# Patient Record
Sex: FEMALE | Race: WHITE | NOT HISPANIC OR LATINO | Employment: FULL TIME | URBAN - METROPOLITAN AREA
[De-identification: names, ages, dates, MRNs, and addresses within clinical notes are randomized per-mention and may not be internally consistent; named-entity substitution may affect disease eponyms.]

---

## 2017-02-14 ENCOUNTER — ALLSCRIPTS OFFICE VISIT (OUTPATIENT)
Dept: OTHER | Facility: OTHER | Age: 27
End: 2017-02-14

## 2018-01-10 NOTE — PROGRESS NOTES
AUG 25 2016         RE: Kenyatta Bruner                               To: [facility]   MR#: [id number]                                   [address]   : [address] [facility], [address] [place] [address]   ENC: [id number]                             Fax: [phone]   (Exam #: [id number])      The LMP of this 23year old,  G4, P2-0-1-2 patient was TARAN 10 2016, giving   her an MARTHA of OCT 12 2016 and a current gestational age of 26 weeks 4 days   by dates  A sonographic examination was performed on AUG 25 2016 using   real time equipment  The ultrasound examination was performed using   abdominal technique  The patient has a BMI of 28 0  Her blood pressure   today was 119/64  Earliest ultrasound found in her record: 16   8w2d   10/16/16 MARTHA         Problem list:   1  Bilateral choroid plexus cysts seen on her 20 week ultrasound have   resolved by 32 weeks   2  Increased inhibin 2 14 MOM      Cardiac motion was observed at 138 bpm       INDICATIONS      abnormal uterine Doppler   fetal growth      Exam Types      Level I      RESULTS      Fetus # 1 of 1   Vertex presentation   Fetal growth appeared normal   Placenta Location = Posterior, fundal   No placenta previa   Placenta Grade = II      MEASUREMENTS (* Included In Average GA)      AC              29 2 cm        33 weeks 2 days* (59%)   BPD              8 4 cm        33 weeks 6 days* (64%)   HC              30 8 cm        33 weeks 6 days* (56%)   Femur            6 1 cm        31 weeks 4 days* (33%)      HC/AC           1 05   FL/AC           0 21   FL/BPD          0 73   Ceph Index      0 80   EFW (Ac/Fl/Hc)  2078 grams - 4 lbs 9 oz                 (48%)      THE AVERAGE GESTATIONAL AGE is 33 weeks 1 day +/- 18 days        AMNIOTIC FLUID      Q1: 4 3      Q2: 5 7      Q3: 6 0      Q4: 4 1   VERO Total = 20 1 cm   Amniotic Fluid: Normal      ANATOMY DETAILS      Visualized Appearing Sonographically Normal: HEAD: (Calvarium, BPD Level, Cavum, Lateral Ventricles, Choroid Plexus,   Cerebellum, Cisterna Magna);    TH  CAV : (Diaphragm); HEART: (Four   Chamber View, Proximal Left Outflow, Proximal Right Outflow, 3 Vessel   Trachea, Interventricular Septum, Interatrial Septum, Cardiac Axis,   Cardiac Position);    STOMACH, RIGHT KIDNEY, LEFT KIDNEY, BLADDER, PLACENTA      ANATOMY COMMENTS      The prior bilateral choroid plexus cysts have resolved  IMPRESSION      Patricia IUP   33 weeks and 1 day by this ultrasound  (MARTHA=OCT 12 2016)   Vertex presentation   Fetal growth appeared normal   Regular fetal heart rate of 138 bpm   Posterior, fundal placenta   No placenta previa      GENERAL COMMENT      The patient was informed of the findings and counseled about the   limitations of the exam in detecting all forms of fetal congenital   abnormalities  The patient has no complaints today  She reports regular fetal movements   and denies problems related to hypertension, gestational diabetes,    labor, or vaginal bleeding  Her prenatal course has apparently been   unremarkable in the interim since her most recent visit here  Exam shows she is comfortable and her abdomen is non tender  Follow up recommended:   No further follow up US's are recommended unless other clinical indicators   arise  RECOMMENDATION      Growth Ultrasound: As indicated      ELIA Lagunas M D     Maternal-Fetal Medicine   Electronically signed 16 16:45

## 2018-01-11 NOTE — PROGRESS NOTES
2016         RE: Lynette Bravo                               To: [facility]   MR#: [id number]                                   [address] [facility]   : APR [address] , [facility]   Toshia Jain: [phone]   (Exam #: [id number])      The LMP of this 26year old,  4, para 2 patient was TARAN 10 2016,   giving her an MARTHA of OCT 12 2016 and a current gestational age of 16 weeks   5 days by dates  A sonographic examination was performed on 2016   using real time equipment  The ultrasound examination was performed using   abdominal technique  The patient has a BMI of 23 9  Her blood pressure   today was 125/71  Earliest ultrasound found in her record: 16   8w2d   10/16/16 MARTHA            Marvel Peres has no complaints  She denies vaginal bleeding  Her prenatal course   has been unremarkable in the interim since her initial MFM evaluation  Multiple longitudinal and transverse sections revealed a alvarenga   intrauterine pregnancy with the fetus in variable presentation  The   placenta is posterior in implantation, and there is no placenta previa  Cardiac motion was observed at 163 bpm       INDICATIONS      first trimester screening      RESULTS      Fetus # 1 of 1   Variable presentation   Fetal growth appeared normal      MEASUREMENTS (* Included In Average GA)      CRL              6 6 cm        12 weeks 5 days*   Nuchal Trans    1 60 mm      THE AVERAGE GESTATIONAL AGE is 12 weeks 5 days +/- 7 days  UTERINE ARTERIES                                  S/D   PI    RI    NOTCH       Left Uterine Artery              2 35         Yes       Right Uterine Artery             1 96         Yes      ANATOMY COMMENTS      Anatomic detail is limited at this gestational age  The yolk sac was not   noted    The fetal cranium appeared normal in shape and the nuchal   translucency was normal in size at 1 6 mm   The nasal bone appears to be   present  The intracranial anatomy was unremarkable  Anatomy of the   fetal thorax appeared within normal limits  The cardiac rhythm was   regular  The four-chamber, alcohol tract, and 3 vessel tracheal views   appear normal   Within the abdomen, stomach was visualized and the   abdominal wall appeared intact  A three vessel cord appears to be present  Active movement of the fetal body & extremities was seen  There is no   suspicion of a subchorionic bleed  The placental cord insertion was   normal    The uterine artery Dopplers are abnormal for this gestational   age  There is no suspicion of a uterine myoma  Free fluid is not seen in   the posterior cul-de-sac  ADNEXA      The left ovary appeared normal and measured 2 3 x 2 1 x 1 9 cm with a   volume of 4 8 cc  The right ovary appeared normal and measured 1 7 x 2 3 x   4 0 cm with a volume of 8 2 cc  AMNIOTIC FLUID      Largest Vertical Pocket = 3 0 cm   Amniotic Fluid: Normal      IMPRESSION      Patricia IUP   12 weeks and 5 days by this ultrasound  (MARTHA=OCT 16 2016)   Variable presentation   Fetal growth appeared normal   Regular fetal heart rate of 163 bpm   Posterior placenta   No placenta previa      GENERAL COMMENT      Today's ultrasound findings and suggested follow-up were discussed in   detail with Michelle  The Sequential Screen was discussed in detail,   including the sensitivities for detection of Down syndrome, Trisomy 18,   and open neural tube defects  Definitive prenatal diagnosis is possible   only through genetic amniocentesis or CVS   Michelle underwent fingerstick   blood collection for hCG and FLAVIA-A to complete the initial component of   the Sequential Screen  Results should be available within one week  Follow-up multiple marker serum screening at 16-18 weeks gestation is   recommended to complete the Sequential Screen    Fetal Level II ultrasound   imaging is scheduled at about 20 weeks gestation  The abnormal maternal uterine artery Doppler study is associated with an   increased risk for adverse pregnancy outcomes related to abnormal   placentation, including IUGR and preeclampsia  Daily low dose aspirin   therapy initiated at less than 16 weeks gestation is associated with a   significant reduction of these risks  I recommended that Michelle initiate   aspirin therapy this week  The face to face time, in addition to time spent discussing ultrasound   results, was 10 minutes, greater than 50% of which was spent during   counseling and coordination of care  ELIA Chase M D     Maternal-Fetal Medicine   Electronically signed 04/11/16 09:01

## 2018-01-11 NOTE — PROGRESS NOTES
MAR 15 2016         RE: Devendra Goes                               To: [facility]   MR#: [id number]                                   [address]   : [address] [facility], [address] [place] [address]   ENC: [id number]                             Fax: [phone]   (Exam #: [id number])      The LMP of this 26year old,  4, para 2 patient was TARAN 10 2016,   giving her an MARTHA of OCT 12 2016 and a current gestational age of 10 weeks   2 days by dates  A sonographic examination was performed on MAR 15 2016   using real time equipment  The ultrasound examination was performed using   abdominal technique  The patient has a BMI of 23 9  Her blood pressure   today was 118/61  Earliest ultrasound found in her record: 16   8w2d   10/16/16 MARTHA       Thank you very much for referring this very nice patient for a    evaluation and viability ultrasound  This is the patient's fourth   pregnancy  She has a history of 2 previous full-term vaginal deliveries   in  and  respectively  She had a first trimester miscarriage in   October of last year  An early ultrasound in the office was concerning   for a possible subchorionic hematoma although the patient has not had any   bleeding, cramps, or other symptoms thereof  She has no significant   medical or surgical history otherwise  She denies any current use of   tobacco, alcohol, or drugs  Her medications include prenatal vitamins and   fish oil and she has no known drug allergies  Her family medical history   is noncontributory  Review of systems is otherwise negative  On exam, the   patient appears well, in no acute distress, and her abdomen is nontender  Multiple longitudinal and transverse sections revealed a alvarenga   intrauterine pregnancy  A normal gestational sac was documented  A normal fetal pole was   visualized   Cardiac motion was observed at 176 bpm  The yolk sac was seen,   measuring 0 39 cm  The amnion was also documented  INDICATIONS      abnormal findings on outside ultrasound      Exam Types      Level I      MEASUREMENTS (* Included In Average GA)      CRL              2 3 cm        8 weeks 6 days *      THE AVERAGE GESTATIONAL AGE is 8 weeks 6 days +/- 5 days  ANATOMY COMMENTS      Anatomic detail is extremely limited at this gestational age  However, a   discrete fetal pole with cardiac and fetal body motion was documented  Limb buds were documented as well  The yolk sac was clearly seen, and the   gestational sac is normal in appearance and located in the fundus of the   uterus  Left lateral and inferior to the sac appears to be a collapsed   twin gestational sac  Free fluid is not seen in the posterior cul-de-sac  ADNEXA      The left ovary appeared normal and measured 2 4 x 2 0 x 1 5 cm with a   volume of 3 8 cc  The right ovary appeared normal and measured 3 4 x 2 1 x   1 9 cm with a volume of 7 1 cc  IMPRESSION      Patricia IUP   8 weeks and 6 days by this ultrasound  (MARTHA=OCT 19 2016)   Regular fetal heart rate of 176 bpm      GENERAL COMMENT      Today's viability ultrasound is overall reassuring  There is a hypoechoic   region within the uterus which measures 2 49 x 0 9 x 1 8 cm with   significant sonographic decidual reaction which is most consistent with a   collapsed gestational sac  I believe this finding is most consistent with   a "vanishing twin" and do not believe it to be a subchorionic hematoma  We discussed the options for genetic screening, including but not limited   to first trimester screening, second trimester screening, combined first   and second trimester screening, noninvasive prenatal testing (NIPT) for   patients at high risk and diagnostic screening through the use of CVS and   amniocentesis    We discussed the risks and benefits of each approach   including the sensitivities and false positive rates as well as the   difference between a screening test and a diagnostic test   At the   conclusion of our discussion the patient elected to schedule Sequential   Screening to delineate her risk for fetal aneuploidy  She will return in   3-4 weeks for the study  Recommend an anatomy ultrasound be scheduled for 20 weeks gestation  Please note, in addition to the time spent discussing the results of the   ultrasound, I spent approximately 15 minutes of face-to-face time with the   patient, greater than 50% of which was spent in counseling and the   coordination of care for this patient  Thank you very much for allowing us to participate in the care of this   very nice patient  Should you have any questions, please do not hesitate   to contact our office  ELIA Vasquez , KT Madsen     Electronically signed 03/15/16 15:25

## 2018-01-12 NOTE — RESULT NOTES
Verified Results  (1) SEQUENTIAL SCREEN 2 [id number] 10:40AM Yuan Loo     Test Name Result Flag Reference   SCAN RESULT      Results available in the [facility], [facility]

## 2018-01-14 VITALS
BODY MASS INDEX: 23.9 KG/M2 | DIASTOLIC BLOOD PRESSURE: 72 MMHG | HEIGHT: 64 IN | WEIGHT: 140 LBS | SYSTOLIC BLOOD PRESSURE: 110 MMHG

## 2018-01-16 NOTE — MISCELLANEOUS
Chief Complaint  Chief Complaint Free Text Note Form: No RICKIE was needed for this patient because she was admitted for the birth of her child  Active Problems    1  Choroid plexus cysts, fetal, affecting care of mother, antepartum (655 03) (O35 8XX0)   2  Encounter for supervision of other normal pregnancy in third trimester (V22 1) (Z34 83)   3  Flu vaccine need (V04 81) (Z23)   4  High risk pregnancy with high inhibin (796 5,V23 89) (O28 9,O09 899)   5  Lung nodule (793 11) (R91 1)   6  Need for diphtheria-tetanus-pertussis (Tdap) vaccine (V06 1) (Z23)   7  Need for Tdap vaccination (V06 1) (Z23)   8  Sciatica, right (724 3) (M54 31)   9  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)    Past Medical History    1  History of Abnormal vaginal Pap smear (795 10) (R87 629)   2  History of esophageal reflux (V12 79) (Z87 19)   3  History of migraine (V12 49) (Z86 69)   4  History of miscarriage (V13 29) (Z87 59)   5  History of RhD negative (V49 89) (Z67 91)    Surgical History    1  Denied: History Of Prior Surgery    Family History  Mother    1  Family history of Hypertension  Grandparent    2  Family history of Skin cancer    Social History    · Denied: History of Alcohol use   · Denied: History of Drug use   · Feels safe at home   · Former smoker (J81 56) (Z69 971)   ·     Current Meds   1  Fish Oil CAPS; Therapy: (Recorded:08Mar2016) to Recorded   2  HydrOXYzine HCl - 25 MG Oral Tablet; TAKE 1 TABLET 3 TO 4 TIMES DAILY AS NEEDED   FOR ITCHING; Therapy: 03JZN3716 to (Evaluate:25Wzf3002)  Requested for: 65NTP7953; Last   DI:29ROG3993 Ordered   3  Prenatal Plus 27-1 MG Oral Tablet; TAKE 1 TABLET DAILY; Therapy: 01ZIC9336 to Recorded    Allergies    1  No Known Drug Allergies    2  No Known Environmental Allergies   3   No Known Food Allergies    Signatures   Electronically signed by : Oswaldo Broussard, ; Oct 24 2016 11:02AM EST                       (Author)    Electronically signed by : KT August ; Oct 24 2016 11:08AM EST                       (Author)

## 2018-01-16 NOTE — PROGRESS NOTES
2016         RE: Vicenta Hurst                               To: [facility]   MR#: [id number]                                   [address]   : [address], [address] [facility]   ENC: [id number]                             Fax: [phone]   (Exam #: [id number])      The LMP of this 23year old,  G4, P2-0-1-2 patient was TARAN 10 2016, giving   her an MARTHA of OCT 12 2016 and a current gestational age of 25 weeks 4 days   by dates  A sonographic examination was performed on 2016 using real   time equipment  The ultrasound examination was performed using abdominal &   vaginal techniques  The patient has a BMI of 25 9  Her blood pressure   today was 135/80  Earliest ultrasound found in her record: 16   8w2d   10/16/16 MARTHA            Irena Melvin has no complaints today  She reports fetal movements and denies   vaginal bleeding  Her recently completed Sequential Screen revealed a   Down syndrome risk of 1:1,800, Trisomy 18 risk of 1:10,000, and ONTD risk   of 1:2,600  Evaluation of the individual analyte levels reveals and   elevated inhibin value of 2 14 MoM  Irena Melvin is taking 81 mg of aspirin a   day, as previously recommended, for the indication of abnormal first   trimester uterine artery Dopplers        Cardiac motion was observed at 142 bpm       INDICATIONS      fetal anatomical survey   abnormal uterine Doppler      Exam Types      LEVEL II   Transvaginal      RESULTS      Fetus # 1 of 1   Vertex presentation   Fetal growth appeared normal   Placenta Location = Posterior   No placenta previa   Placenta Grade = I      MEASUREMENTS (* Included In Average GA)      AC              15 2 cm        20 weeks 1 day * (41%)   BPD              4 9 cm        20 weeks 6 days* (55%)   HC              18 2 cm        20 weeks 4 days* (45%)   Femur            3 3 cm        20 weeks 4 days* (41%)      Nuchal Fold      2 3 mm      Humerus          3 2 cm        20 weeks 4 days  (50%)   Radius           2 6 cm        20 weeks 6 days   Ulna             2 9 cm        20 weeks 4 days   Tibia            2 9 cm        20 weeks 5 days  (45%)   Fibula           3 0 cm        20 weeks 0 days      Cerebellum       2 2 cm        21 weeks 1 day   CisternaMagna    5 9 mm      HC/AC           1 20   FL/AC           0 22   FL/BPD          0 67   EFW (Ac/Fl/Hc)   350 grams - 0 lbs 12 oz      THE AVERAGE GESTATIONAL AGE is 20 weeks 4 days +/- 10 days  AMNIOTIC FLUID         Largest Vertical Pocket = 5 8 cm   Amniotic Fluid: Normal      ANATOMY SUMMARY      The fetal cranium appeared normal in shape  Bilateral choroid plexus   cysts are present  The lateral ventricles were not dilated and the   midline structures were not deviated  The cerebellum and cisterna magna   were visualized and appeared normal  The nuchal fold is not abnormally   thickened, measured at 3 3 mm  The calvarium showed no evidence of defect,   scalloping of the parietal bones or abnormal shape  The cavum septum   pellucidum appears normal  The fetal face appears normal  There is no   evidence of facial clefting, hypotelorism, hypertelorism or micrognathia  A normal appearing nasal bone measuring 6 6 mm was identified in the   sagittal profile view  3-D views of the face appeared normal  Anatomy of   the fetal thorax appeared within normal limits  The fetal diaphragm   appears intact  There were no intrathoracic masses noted or evidence of   pleural/pericardial effusion  The cardiac arch views appeared normal     There was no suspicion of tricuspid regurgitation  The cardiac size and   structures appeared sonographically normal at the four chamber view, and   cardiac rhythm was regular  There is no suspicion of fetal dysrhythmia  There was no evidence of cardiomegaly, pericardial effusion or   atrial/ventricular disproportion   Two atrioventricular valves were noted   with normal inflow, confirmed with color Doppler imaging  Ventriculoarterial connections are appropriate and normal short axis of   the great vessels is demonstrated  The interventricular septum appeared to   be intact  There is no suspicion of an echogenic intracardiac focus  The   three vessel  tracheal view appears normal   The outflow tract views are   normal  The abdominal cavity appears normal  There is no evidence of fetal   bowel obstruction or abnormally echogenic bowel  Ascites is not present  The fetal stomach appears normal in size and shape  The right kidney   appears normal  There is no suspicion of pyelectasis  Renal cysts are not   present  The echogenicity of the kidney is normal  The left kidney   appears normal   There is no suspicion of pyelectasis  The echogenicity   of the kidney is normal   renal cysts are not present  The fetal bladder   appears normal in size and shape  There is no suspicion of ureterocele  The abdominal wall appears intact  A normal abdominal cord insertion is   noted  The spine was visualized from cervical to sacral region, within the   resolution of the ultrasound equipment, without evidence of a neural tube   defect  or other malformation  Active movement of the extremities was seen   and fetal body motion was also observed during this examination  There was   no evidence of long bone abnormality and the extremities were followed to   their distal extent  There was no evidence of club foot or rocker bottom   deformity  There was no evidence of abnormal hand posturing noted  The   hands are not clenched  The genitalia appeared normal  The placenta   appears normal  There is no evidence of advanced placental maturation,   placental abruption, intervillous thrombosis, placental infarction or   multiple venous lakes  There is a 3 vessel cord with normal insertion   site  The uterine contour appears normal  There is no suspicion of a   uterine myoma        ADNEXA      The left ovary appeared normal and measured 1 7 x 2 1 x 1 6 cm with a   volume of 3 0 cc  The right ovary appeared normal and measured 2 1 x 3 0 x   2 8 cm with a volume of 9 2 cc  UTERINE ARTERIES                                  S/D   PI    RI    NOTCH       Left Uterine Artery              1 44         No       Right Uterine Artery             0 96         No      CERVICAL EVALUATION      The cervix appeared normal (Ultrasound Examination)  SUPINE      Cervical Length: 3 30 cm      OTHER TEST RESULTS           Funneling?: No             Dynamic Changes?: No        Resp  To TFP?: No      IMPRESSION      Patricia IUP   20 weeks and 4 days by this ultrasound  (MARTHA=OCT 16 2016)   Vertex presentation   Fetal growth appeared normal   Normal anatomy survey   Regular fetal heart rate of 142 bpm   Posterior placenta   No placenta previa      GENERAL COMMENT      Apparently isolated, bilateral choroid plexus cysts are present  No fetal   structural abnormality or ultrasound marker for aneuploidy is otherwise   identified on the Level II ultrasound study today  Fetal growth, amniotic   fluid volume, and maternal uterine artery Doppler study are normal   The   placenta is normal in appearance  The cervix is normal in appearance by transvaginal sonography  The   cervical length is normal   Cervical debris is not present  Cervical   funneling is not present  Neither provocative nor dynamic change is   appreciated  Today's ultrasound findings and suggested follow-up were discussed in   detail with Michelle  We discussed that prenatal ultrasound cannot rule out   all congenital abnormalities  Her Sequential Screen results were discussed   in detail  I recommended discontinuation of low dose aspirin therapy given   normalization of uterine artery Doppler studies today   The mildly elevated   inhibin value is associated with an increased risk for adverse pregnancy   outcomes related to abnormal placentation, including growth restriction  She will return to the [facility]  at 32 weeks gestation to assess   interval growth  Given the otherwise normal ultrasound study today, and   in light of the low a priori risk for trisomy 18 on the Sequential Screen,   the presence of apparently isolated choroid plexus cysts most likely   represents normal anatomic variation  Invasive prenatal diagnosis by   genetic amniocentesis is not warranted  The face to face time, in addition to time spent discussing ultrasound   results, was 10 minutes, greater than 50% of which was spent during   counseling and coordination of care  ELIA Bautista M D     Maternal-Fetal Medicine   Electronically signed 06/03/16 09:01